# Patient Record
Sex: FEMALE | Race: AMERICAN INDIAN OR ALASKA NATIVE
[De-identification: names, ages, dates, MRNs, and addresses within clinical notes are randomized per-mention and may not be internally consistent; named-entity substitution may affect disease eponyms.]

---

## 2020-02-03 ENCOUNTER — HOSPITAL ENCOUNTER (EMERGENCY)
Dept: HOSPITAL 5 - ED | Age: 20
Discharge: HOME | End: 2020-02-03
Payer: SELF-PAY

## 2020-02-03 VITALS — DIASTOLIC BLOOD PRESSURE: 49 MMHG | SYSTOLIC BLOOD PRESSURE: 116 MMHG

## 2020-02-03 DIAGNOSIS — G40.909: Primary | ICD-10-CM

## 2020-02-03 DIAGNOSIS — Z79.899: ICD-10-CM

## 2020-02-03 DIAGNOSIS — Z76.0: ICD-10-CM

## 2020-02-03 PROCEDURE — 99282 EMERGENCY DEPT VISIT SF MDM: CPT

## 2020-02-03 NOTE — EMERGENCY DEPARTMENT REPORT
ED General Adult HPI





- General


Chief complaint: Medical Clearance


Stated complaint: SEIZURE 2H AGO/HEAD HURT


Time Seen by Provider: 02/03/20 16:47


Source: patient


Mode of arrival: Ambulatory


Limitations: No Limitations





- History of Present Illness


Initial comments: 


MOved here from [facility].  Ran out of [employer] med.  Has no other complaint.





-: Gradual


Associated Symptoms: denies other symptoms





- Related Data


                                Home Medications











 Medication  Instructions  Recorded  Confirmed  Last Taken


 


lamoTRIgine [Lamotrigine] 300 mg PO DAILY 01/01/17 01/18/17 12/31/16 23:00





    300 mg








                                  Previous Rx's











 Medication  Instructions  Recorded  Last Taken  Type


 


Ferrous Sulfate [Feosol 325 MG tab] 325 mg PO BID #60 tablet 01/15/17 Unknown Rx


 


Ibuprofen [Motrin 800 MG tab] 800 mg PO Q6H PRN #30 tablet 01/15/17 Unknown Rx


 


oxyCODONE /ACETAMINOPHEN [Percocet 1 - 2 tab PO Q4H PRN #30 tablet 01/15/17 

Unknown Rx





5/325 mg]    


 


lamoTRIgine [LaMICtal] 150 mg PO BID #60 tab 02/03/20 Unknown Rx











                                    Allergies











Allergy/AdvReac Type Severity Reaction Status Date / Time


 


No Known Allergies Allergy   Verified 10/10/16 15:09














ED Review of Systems


ROS: 


Stated complaint: SEIZURE 2H AGO/HEAD HURT


Other details as noted in HPI





Comment: All other systems reviewed and negative





ED Past Medical Hx





- Past Medical History


Previous Medical History?: Yes


Hx Hypertension: No


Hx Congestive Heart Failure: No


Hx Diabetes: No


Hx Deep Vein Thrombosis: No


Hx Renal Disease: No


Hx Sickle Cell Disease: No


Hx Seizures: Yes


Hx Asthma: No


Hx COPD: No


Hx HIV: No





- Surgical History


Past Surgical History?: Yes


Additional Surgical History: C section





- Social History


Smoking Status: Never Smoker


Substance Use Type: None





- Medications


Home Medications: 


                                Home Medications











 Medication  Instructions  Recorded  Confirmed  Last Taken  Type


 


lamoTRIgine [Lamotrigine] 300 mg PO DAILY 01/01/17 01/18/17 12/31/16 23:00 

History





    300 mg 


 


Ferrous Sulfate [Feosol 325 MG tab] 325 mg PO BID #60 tablet 01/15/17  Unknown 

Rx


 


Ibuprofen [Motrin 800 MG tab] 800 mg PO Q6H PRN #30 tablet 01/15/17  Unknown Rx


 


oxyCODONE /ACETAMINOPHEN [Percocet 1 - 2 tab PO Q4H PRN #30 tablet 01/15/17  

Unknown Rx





5/325 mg]     


 


lamoTRIgine [LaMICtal] 150 mg PO BID #60 tab 02/03/20  Unknown Rx














ED Physical Exam





- General


Limitations: No Limitations


General appearance: alert, in no apparent distress





- Head


Head exam: Present: atraumatic, normocephalic





- Eye


Eye exam: Present: normal appearance.  Absent: scleral icterus





- ENT


ENT exam: Present: mucous membranes moist





- Neck


Neck exam: Present: normal inspection





- Respiratory


Respiratory exam: Present: normal lung sounds bilaterally.  Absent: respiratory 

distress





- Cardiovascular


Cardiovascular Exam: Present: regular rate, normal rhythm.  Absent: systolic 

murmur, diastolic murmur, rubs, gallop





- GI/Abdominal


GI/Abdominal exam: Present: soft, normal bowel sounds





- Extremities Exam


Extremities exam: Present: normal inspection





- Back Exam


Back exam: Present: normal inspection





- Neurological Exam


Neurological exam: Present: alert, oriented X3, CN II-XII intact, normal gait.  

Absent: motor sensory deficit





- Psychiatric


Psychiatric exam: Present: normal affect, normal mood





- Skin


Skin exam: Present: warm, dry, intact, normal color.  Absent: rash





ED Course





                                   Vital Signs











  02/03/20





  15:28


 


Temperature 97.9 F


 


Pulse Rate 77


 


Respiratory 16





Rate 


 


Blood Pressure 116/49


 


O2 Sat by Pulse 98





Oximetry 











Critical care attestation.: 


If time is entered above; I have spent that time in minutes in the direct care 

of this critically ill patient, excluding procedure time.








ED Disposition


Clinical Impression: 


 Seizure disorder





Disposition: DC-01 TO HOME OR SELFCARE


Is pt being admited?: No


Does the pt Need Aspirin: No


Condition: Stable


Instructions:  Epilepsy (ED)


Additional Instructions: 


Primary care at [facility].


Prescriptions: 


lamoTRIgine [LaMICtal] 150 mg PO BID #60 tab


Referrals: 


[facility] [Provider Group] - 3-5 Days


Time of Disposition: 16:50

## 2020-04-12 ENCOUNTER — HOSPITAL ENCOUNTER (EMERGENCY)
Dept: HOSPITAL 5 - ED | Age: 20
Discharge: HOME | End: 2020-04-12
Payer: SELF-PAY

## 2020-04-12 VITALS — DIASTOLIC BLOOD PRESSURE: 65 MMHG | SYSTOLIC BLOOD PRESSURE: 118 MMHG

## 2020-04-12 DIAGNOSIS — Z86.69: ICD-10-CM

## 2020-04-12 DIAGNOSIS — Z79.899: ICD-10-CM

## 2020-04-12 DIAGNOSIS — Z85.41: ICD-10-CM

## 2020-04-12 DIAGNOSIS — N76.0: Primary | ICD-10-CM

## 2020-04-12 DIAGNOSIS — Z98.890: ICD-10-CM

## 2020-04-12 LAB
BILIRUB UR QL STRIP: (no result)
BLOOD UR QL VISUAL: (no result)
MUCOUS THREADS #/AREA URNS HPF: (no result) /HPF
PH UR STRIP: 5 [PH] (ref 5–7)
PROT UR STRIP-MCNC: (no result) MG/DL
RBC #/AREA URNS HPF: 4 /HPF (ref 0–6)
UROBILINOGEN UR-MCNC: < 2 MG/DL (ref ?–2)
WBC #/AREA URNS HPF: 5 /HPF (ref 0–6)

## 2020-04-12 PROCEDURE — 81025 URINE PREGNANCY TEST: CPT

## 2020-04-12 PROCEDURE — 87591 N.GONORRHOEAE DNA AMP PROB: CPT

## 2020-04-12 PROCEDURE — 99284 EMERGENCY DEPT VISIT MOD MDM: CPT

## 2020-04-12 PROCEDURE — 81001 URINALYSIS AUTO W/SCOPE: CPT

## 2020-04-12 PROCEDURE — 87210 SMEAR WET MOUNT SALINE/INK: CPT

## 2020-04-12 NOTE — EMERGENCY DEPARTMENT REPORT
ED Female  HPI





- General


Chief complaint: Urogenital-Female


Stated complaint: ABD PAIN


Time Seen by Provider: 20 19:41


Source: EMS


Mode of arrival: Ambulatory


Limitations: No Limitations





- History of Present Illness


Initial comments: 





19-year-old -American female brought in by EMS for bilateral pelvic pain.

 Patient states that she was diagnosed with cervical cancer but has been not 

followed up.  Patient reports that she has vaginal discharge with mild odor.  

Patient states that she last saw her primary care provider was 2020.  

Patient does admit to unprotected intercourse and is had 2 partners in the last 

6 months.  Patient's last menstrual period was 2020.  Patient is  1 

para 1.


MD Complaint: vaginal discharge, pelvic pain





- Related Data


                                Home Medications











 Medication  Instructions  Recorded  Confirmed  Last Taken


 


lamoTRIgine [Lamotrigine] 300 mg PO DAILY 17 23:00





    300 mg








                                  Previous Rx's











 Medication  Instructions  Recorded  Last Taken  Type


 


Ferrous Sulfate [Feosol 325 MG tab] 325 mg PO BID #60 tablet 01/15/17 Unknown Rx


 


Ibuprofen [Motrin 800 MG tab] 800 mg PO Q6H PRN #30 tablet 01/15/17 Unknown Rx


 


oxyCODONE /ACETAMINOPHEN [Percocet 1 - 2 tab PO Q4H PRN #30 tablet 01/15/17 

Unknown Rx





5/325 mg]    


 


lamoTRIgine [LaMICtal] 150 mg PO BID #60 tab 20 Unknown Rx


 


metroNIDAZOLE [Flagyl TAB] 500 mg PO Q12HR #14 tab 20 Unknown Rx











                                    Allergies











Allergy/AdvReac Type Severity Reaction Status Date / Time


 


No Known Allergies Allergy   Verified 10/10/16 15:09














ED Review of Systems


ROS: 


Stated complaint: ABD PAIN


Other details as noted in HPI





Comment: All other systems reviewed and negative





ED Past Medical Hx





- Past Medical History


Previous Medical History?: No


Hx Hypertension: No


Hx Congestive Heart Failure: No


Hx Diabetes: No


Hx Deep Vein Thrombosis: No


Hx Renal Disease: No


Hx Sickle Cell Disease: No


Hx Seizures: Yes


Hx Asthma: No


Hx COPD: No


Hx HIV: No


Additional medical history: ovarian cyst, recent dx of cervical ca 





- Surgical History


Additional Surgical History: C section





- Social History


Smoking Status: Never Smoker


Substance Use Type: None





- Medications


Home Medications: 


                                Home Medications











 Medication  Instructions  Recorded  Confirmed  Last Taken  Type


 


lamoTRIgine [Lamotrigine] 300 mg PO DAILY 17 23:00 

History





    300 mg 


 


Ferrous Sulfate [Feosol 325 MG tab] 325 mg PO BID #60 tablet 01/15/17  Unknown 

Rx


 


Ibuprofen [Motrin 800 MG tab] 800 mg PO Q6H PRN #30 tablet 01/15/17  Unknown Rx


 


oxyCODONE /ACETAMINOPHEN [Percocet 1 - 2 tab PO Q4H PRN #30 tablet 01/15/17  

Unknown Rx





5/325 mg]     


 


lamoTRIgine [LaMICtal] 150 mg PO BID #60 tab 20  Unknown Rx


 


metroNIDAZOLE [Flagyl TAB] 500 mg PO Q12HR #14 tab 20  Unknown Rx














ED Physical Exam





- General


Limitations: No Limitations


General appearance: alert, in no apparent distress, obese





- Head


Head exam: Present: atraumatic, normocephalic





- Eye


Eye exam: Present: normal appearance





- ENT


ENT exam: Present: mucous membranes moist





- 


External exam: Present: normal external exam


Speculum exam: Absent: erythema, vaginal discharge, cervical discharge


Bi-manual exam: Present: adnexal tenderness (right).  Absent: cervical motion 

tendernes, adnexal mass, uterine enlargement, uterine tenderness





- Extremities Exam


Extremities exam: Present: normal inspection





- Neurological Exam


Neurological exam: Present: alert, oriented X3





- Psychiatric


Psychiatric exam: Present: normal affect, normal mood





- Skin


Skin exam: Present: warm, dry, intact, normal color.  Absent: rash





ED Course


                                   Vital Signs











  20





  18:13


 


Temperature 98 F


 


Pulse Rate 83


 


Respiratory 18





Rate 


 


Blood Pressure 118/65


 


O2 Sat by Pulse 95





Oximetry 














ED Medical Decision Making





- Medical Decision Making


19-year-old -American female brought in by EMS for bilateral pelvic pain.

 Patient states that she was diagnosed with cervical cancer but has been not 

followed up.  Patient reports that she has vaginal discharge with mild odor.  

Patient states that she last saw her primary care provider was 2020.  

Patient does admit to unprotected intercourse and is had 2 partners in the last 

6 months.  Patient's last menstrual period was 2020.  Patient is  1 

para 1.














Urinalysis unremarkable negative pregnancy test wet prep is pending Chlamydia 

gonorrhea has been collected and sent to lab.








Wet prep positive for bacterial vaginosis.  Negative trichomoniasis negative 

yeast.  Gonorrhea and Chlamydia is pending.


Critical care attestation.: 


If time is entered above; I have spent that time in minutes in the direct care 

of this critically ill patient, excluding procedure time.








ED Disposition


Clinical Impression: 


 Bacterial vaginosis





Disposition: DC-01 TO HOME OR SELFCARE


Is pt being admited?: No


Does the pt Need Aspirin: No


Condition: Stable


Instructions:  Bacterial Vaginosis (ED)


Additional Instructions: 


Wet prep came back positive for bacterial vaginosis complete your antibiotics as

prescribed.


Prescriptions: 


metroNIDAZOLE [Flagyl TAB] 500 mg PO Q12HR #14 tab


Referrals: 


PRIMARY CARE,MD [Primary Care Provider] - 3-5 Days


Forms:  [facility] Treatment and Prevention